# Patient Record
Sex: MALE | Race: WHITE
[De-identification: names, ages, dates, MRNs, and addresses within clinical notes are randomized per-mention and may not be internally consistent; named-entity substitution may affect disease eponyms.]

---

## 2024-08-06 PROBLEM — Z00.00 ENCOUNTER FOR PREVENTIVE HEALTH EXAMINATION: Status: ACTIVE | Noted: 2024-08-06

## 2024-08-07 ENCOUNTER — APPOINTMENT (OUTPATIENT)
Dept: ORTHOPEDIC SURGERY | Facility: CLINIC | Age: 64
End: 2024-08-07

## 2024-08-07 PROBLEM — I10 HYPERTENSION: Status: RESOLVED | Noted: 2024-08-07 | Resolved: 2024-08-07

## 2024-08-07 PROBLEM — S46.012A TRAUMATIC TEAR OF LEFT ROTATOR CUFF, UNSPECIFIED TEAR EXTENT, INITIAL ENCOUNTER: Status: ACTIVE | Noted: 2024-08-07

## 2024-08-07 PROBLEM — M75.42 IMPINGEMENT SYNDROME OF LEFT SHOULDER: Status: ACTIVE | Noted: 2024-08-07

## 2024-08-07 PROBLEM — M75.22 BICEPS TENDINITIS OF LEFT UPPER EXTREMITY: Status: ACTIVE | Noted: 2024-08-07

## 2024-08-07 PROBLEM — M75.41 IMPINGEMENT SYNDROME OF RIGHT SHOULDER: Status: ACTIVE | Noted: 2024-08-07

## 2024-08-07 PROBLEM — S46.812A PARTIAL TEAR OF LEFT SUBSCAPULARIS TENDON, INITIAL ENCOUNTER: Status: ACTIVE | Noted: 2024-08-07

## 2024-08-07 PROCEDURE — 73010 X-RAY EXAM OF SHOULDER BLADE: CPT | Mod: LT

## 2024-08-07 PROCEDURE — 99244 OFF/OP CNSLTJ NEW/EST MOD 40: CPT

## 2024-08-07 PROCEDURE — 73030 X-RAY EXAM OF SHOULDER: CPT | Mod: LT

## 2024-08-07 NOTE — IMAGING
[Left] : left shoulder [FreeTextEntry1] : The Gh is OK.  There are AC cysts. [FreeTextEntry5] : There is a Type I-II acromion.

## 2024-08-07 NOTE — ASSESSMENT
[FreeTextEntry1] : We reviewed the findings and the history. Questions were answered and concerns addressed. The options were outlined. A MDP is planned. PT advised for both shoulders. An MRI on the left is requested. He is WFD. He has help.  Patient was seen by Dr. David Estrada. Patient was seen by Marilyn CHUNG under the supervision of Dr. David Estrada. Progress note was completed by Marilyn CHUNG. Entered by Gisela Du acting as scribe.

## 2024-08-07 NOTE — CONSULT LETTER
[Dear  ___] : Dear  [unfilled], [FreeTextEntry1] : Thank you for referring your patient for consultation.  Please see my note below.   If you have any questions, please do not hesitate to contact me.   Sincerely,   David Estrada M.D. Shoulder Surgery

## 2024-08-07 NOTE — HISTORY OF PRESENT ILLNESS
[9] : 9 [Dull/Aching] : dull/aching [Full time] : Work status: full time [] : yes [FreeTextEntry1] : shoulders

## 2024-08-07 NOTE — REASON FOR VISIT
[FreeTextEntry2] :  7/12/24.  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   He injured the right shoulder in October, and has a separate claim for this.  Reaching is painful.  He notices pain when he wakes up in the morning.  The left arm feels weak.  There are days he can't lift it.  No numbness.  Advil 400mg helps.  No prior treatment to either shoulder.  No history of any issues.

## 2024-08-07 NOTE — PHYSICAL EXAM
[Left] : left shoulder [Moderate] : moderate [4 ___] : forward flexion 4[unfilled]/5 [Right] : right shoulder [Standing] : standing [Mild] : mild [5 ___] : forward flexion 5[unfilled]/5 [5___] : external rotation 5[unfilled]/5 [] : no sensory deficits [FreeTextEntry8] : There is LT tenderness. [de-identified] : +bellypress [FreeTextEntry9] : IR to T12. [TWNoteComboBox6] : internal rotation L1 [TWNoteComboBox4] : passive forward flexion 165 degrees [de-identified] : external rotation 75 degrees

## 2024-08-12 ENCOUNTER — APPOINTMENT (OUTPATIENT)
Dept: MRI IMAGING | Facility: CLINIC | Age: 64
End: 2024-08-12
Payer: OTHER MISCELLANEOUS

## 2024-08-12 PROCEDURE — 73221 MRI JOINT UPR EXTREM W/O DYE: CPT | Mod: LT

## 2024-08-28 ENCOUNTER — APPOINTMENT (OUTPATIENT)
Dept: ORTHOPEDIC SURGERY | Facility: CLINIC | Age: 64
End: 2024-08-28
Payer: OTHER MISCELLANEOUS

## 2024-08-28 VITALS — HEIGHT: 73 IN | BODY MASS INDEX: 22.53 KG/M2 | WEIGHT: 170 LBS

## 2024-08-28 DIAGNOSIS — S46.812A STRAIN OF OTHER MUSCLES, FASCIA AND TENDONS AT SHOULDER AND UPPER ARM LEVEL, LEFT ARM, INITIAL ENCOUNTER: ICD-10-CM

## 2024-08-28 DIAGNOSIS — M75.22 BICIPITAL TENDINITIS, LEFT SHOULDER: ICD-10-CM

## 2024-08-28 DIAGNOSIS — M75.42 IMPINGEMENT SYNDROME OF LEFT SHOULDER: ICD-10-CM

## 2024-08-28 PROCEDURE — 99214 OFFICE O/P EST MOD 30 MIN: CPT

## 2024-08-28 NOTE — HISTORY OF PRESENT ILLNESS
[9] : 9 [3] : 3 [Sharp] : sharp [Constant] : constant [Household chores] : household chores [Leisure] : leisure [Work] : work [Sleep] : sleep [Meds] : meds [Full time] : Work status: full time [de-identified] : WC DOI 7/12/24: Patient is here for MRI results of his left shoulder. [] : no [FreeTextEntry1] : Left shoulder [FreeTextEntry9] : Advil [de-identified] : certain movements

## 2024-08-28 NOTE — HISTORY OF PRESENT ILLNESS
[9] : 9 [3] : 3 [Sharp] : sharp [Constant] : constant [Household chores] : household chores [Leisure] : leisure [Work] : work [Sleep] : sleep [Meds] : meds [Full time] : Work status: full time [de-identified] : WC DOI 7/12/24: Patient is here for MRI results of his left shoulder. [] : no [FreeTextEntry1] : Left shoulder [FreeTextEntry9] : Advil [de-identified] : certain movements

## 2024-08-28 NOTE — CONSULT LETTER
[Dear  ___] : Dear  [unfilled], [Courtesy Letter:] : I had the pleasure of seeing your patient, [unfilled], in my office today. [FreeTextEntry1] : Please see my note below.  If you have any questions, please do not hesitate to contact me.  Sincerely,  David Estrada M.D. Shoulder Surgery

## 2024-08-28 NOTE — PHYSICAL EXAM
[Left] : left shoulder [Moderate] : moderate [Right] : right shoulder [Standing] : standing [Mild] : mild [5___] : external rotation 5[unfilled]/5 [5 ___] : forward flexion 5[unfilled]/5 [] : no sensory deficits [FreeTextEntry8] : There is LT tenderness. [de-identified] : +bellypress [FreeTextEntry9] : IR to T12. [TWNoteComboBox6] : internal rotation L1 [TWNoteComboBox4] : passive forward flexion 165 degrees [de-identified] : external rotation 75 degrees

## 2024-08-28 NOTE — ASSESSMENT
[FreeTextEntry1] : . We reviewed the MRI.  We discussed treatment options, both non-operative and operative.  I do think he is a candidate for surgery.  Pain relief is a goal as well as improving function and motion.  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  Interscalene anesthesia, general anesthesia and postoperative pain management were discussed.  The importance of physical therapy postoperatively, the gradual recovery and the rehabilitation program with initial driving restrictions were noted.  The use of a Cryo-Cuff by [employer] and a sling for functional recovery was reviewed.  He understands there are no guarantees.  The benefits of decreased pain, increased function and restoring anatomy were outlined.  The risks were reviewed including, but not limited to, infection, failure, bleeding, stiffness, pain, clotting, fracture, re-tear, hardware failure, deformity, functional limitation, scarring, neurovascular compromise, and narcotic use issues.  Under certain circumstances we discussed, further surgery may be indicated.  He understands that 100% recovery is not expected or guaranteed, and the desired level of function may not be achievable.  The complicated nature of his condition, including the tear pattern, was noted.  We discussed the potential for a prolonged recovery course and the potential for this to affect his activities, which could include a work regimen.  His questions were answered.  Other opinions can be pursued, as we discussed.  He does wish to proceed with surgery.  This would include a left shoulder arthroscopy, debridement, synovectomy, subscapularis repair, biceps tenodesis, decompression, distal clavicle resection,  Medical clearance is planned.  PT is prescribed.  We will schedule this at the earliest mutual convenient time.  Patient was seen by Dr. David Estrada. Patient was seen by Marilyn CHUNG under the supervision of Dr. David Estrada. Progress note was completed by Marilyn CHUNG. Entered by Gisela Du acting as scribe.

## 2024-08-28 NOTE — PHYSICAL EXAM
[Left] : left shoulder [Moderate] : moderate [Right] : right shoulder [Standing] : standing [Mild] : mild [5___] : external rotation 5[unfilled]/5 [5 ___] : forward flexion 5[unfilled]/5 [] : no sensory deficits [FreeTextEntry8] : There is LT tenderness. [de-identified] : +bellypress [FreeTextEntry9] : IR to T12. [TWNoteComboBox6] : internal rotation L1 [TWNoteComboBox4] : passive forward flexion 165 degrees [de-identified] : external rotation 75 degrees

## 2024-08-28 NOTE — DATA REVIEWED
[FreeTextEntry1] : MRI L SHOULDER [facility] 8/12/24:  The biceps is dislocated into a subscapularis tear. Labral tears are noted. Minor AC changes are noted. There is a small posterior 14mm cyst. The muscle is good.   L XR: The GH joint is OK. There are AC cysts. There is a Type I-II acromion.

## 2024-08-28 NOTE — REASON FOR VISIT
[FreeTextEntry2] : WC 7/12/24.  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  He injured the right shoulder in October, and has a separate claim for this.  Reaching is painful.  He notices pain when he wakes up in the morning.  The left arm feels weak.  There are days he can't lift it.  No numbness.  Advil 400mg helps.  The MRI was done 8/12/24.  MDP helped initially.  Advil helps.

## 2024-10-21 DIAGNOSIS — M75.22 BICIPITAL TENDINITIS, LEFT SHOULDER: ICD-10-CM

## 2024-10-21 RX ORDER — GABAPENTIN 300 MG/1
300 CAPSULE ORAL 3 TIMES DAILY
Qty: 15 | Refills: 0 | Status: ACTIVE | COMMUNITY
Start: 2024-10-21 | End: 1900-01-01

## 2024-10-21 RX ORDER — HYDROCODONE BITARTRATE AND ACETAMINOPHEN 7.5; 325 MG/1; MG/1
7.5-325 TABLET ORAL
Qty: 42 | Refills: 0 | Status: ACTIVE | COMMUNITY
Start: 2024-10-21 | End: 1900-01-01

## 2024-10-21 RX ORDER — KETOROLAC TROMETHAMINE 10 MG/1
10 TABLET, FILM COATED ORAL EVERY 6 HOURS
Qty: 20 | Refills: 0 | Status: ACTIVE | COMMUNITY
Start: 2024-10-21 | End: 1900-01-01

## 2024-10-23 ENCOUNTER — APPOINTMENT (OUTPATIENT)
Dept: ORTHOPEDIC SURGERY | Facility: CLINIC | Age: 64
End: 2024-10-23

## 2024-10-23 PROCEDURE — L3670: CPT | Mod: 1L,LT

## 2024-10-31 ENCOUNTER — APPOINTMENT (OUTPATIENT)
Age: 64
End: 2024-10-31

## 2024-10-31 PROCEDURE — 29827 SHO ARTHRS SRG RT8TR CUF RPR: CPT | Mod: AS,LT

## 2024-10-31 PROCEDURE — 29823 SHO ARTHRS SRG XTNSV DBRDMT: CPT | Mod: AS,59,LT

## 2024-10-31 PROCEDURE — 29827 SHO ARTHRS SRG RT8TR CUF RPR: CPT | Mod: LT

## 2024-10-31 PROCEDURE — 29828 SHO ARTHRS SRG BICP TENODSIS: CPT | Mod: LT

## 2024-10-31 PROCEDURE — 29828 SHO ARTHRS SRG BICP TENODSIS: CPT | Mod: AS,LT

## 2024-10-31 PROCEDURE — 29824 SHO ARTHRS SRG DSTL CLAVICLC: CPT | Mod: AS,LT

## 2024-10-31 PROCEDURE — 29824 SHO ARTHRS SRG DSTL CLAVICLC: CPT | Mod: LT

## 2024-10-31 PROCEDURE — 29823 SHO ARTHRS SRG XTNSV DBRDMT: CPT | Mod: 59,LT

## 2024-10-31 PROCEDURE — 29821 SHO ARTHRS SRG COMPL SYNVCT: CPT | Mod: AS,LT

## 2024-10-31 PROCEDURE — 29826 SHO ARTHRS SRG DECOMPRESSION: CPT | Mod: AS,LT

## 2024-10-31 PROCEDURE — 29826 SHO ARTHRS SRG DECOMPRESSION: CPT | Mod: LT

## 2024-10-31 PROCEDURE — 29821 SHO ARTHRS SRG COMPL SYNVCT: CPT | Mod: LT

## 2024-11-06 ENCOUNTER — APPOINTMENT (OUTPATIENT)
Dept: ORTHOPEDIC SURGERY | Facility: CLINIC | Age: 64
End: 2024-11-06
Payer: OTHER MISCELLANEOUS

## 2024-11-06 VITALS — WEIGHT: 170 LBS | BODY MASS INDEX: 22.53 KG/M2 | HEIGHT: 73 IN

## 2024-11-06 DIAGNOSIS — M75.42 IMPINGEMENT SYNDROME OF LEFT SHOULDER: ICD-10-CM

## 2024-11-06 DIAGNOSIS — M25.512 PAIN IN LEFT SHOULDER: ICD-10-CM

## 2024-11-06 DIAGNOSIS — S46.812D STRAIN OF OTHER MUSCLES, FASCIA AND TENDONS AT SHOULDER AND UPPER ARM LEVEL, LEFT ARM, SUBSEQUENT ENCOUNTER: ICD-10-CM

## 2024-11-06 DIAGNOSIS — M75.22 BICIPITAL TENDINITIS, LEFT SHOULDER: ICD-10-CM

## 2024-11-06 PROCEDURE — 73030 X-RAY EXAM OF SHOULDER: CPT | Mod: LT

## 2024-11-06 PROCEDURE — 99024 POSTOP FOLLOW-UP VISIT: CPT

## 2024-12-04 ENCOUNTER — APPOINTMENT (OUTPATIENT)
Dept: ORTHOPEDIC SURGERY | Facility: CLINIC | Age: 64
End: 2024-12-04
Payer: OTHER MISCELLANEOUS

## 2024-12-04 VITALS — BODY MASS INDEX: 22.53 KG/M2 | HEIGHT: 73 IN | WEIGHT: 170 LBS

## 2024-12-04 DIAGNOSIS — S46.812D STRAIN OF OTHER MUSCLES, FASCIA AND TENDONS AT SHOULDER AND UPPER ARM LEVEL, LEFT ARM, SUBSEQUENT ENCOUNTER: ICD-10-CM

## 2024-12-04 DIAGNOSIS — M75.42 IMPINGEMENT SYNDROME OF LEFT SHOULDER: ICD-10-CM

## 2024-12-04 DIAGNOSIS — M75.22 BICIPITAL TENDINITIS, LEFT SHOULDER: ICD-10-CM

## 2024-12-04 DIAGNOSIS — M25.512 PAIN IN LEFT SHOULDER: ICD-10-CM

## 2024-12-04 PROCEDURE — 99024 POSTOP FOLLOW-UP VISIT: CPT

## 2025-01-08 ENCOUNTER — APPOINTMENT (OUTPATIENT)
Dept: ORTHOPEDIC SURGERY | Facility: CLINIC | Age: 65
End: 2025-01-08
Payer: OTHER MISCELLANEOUS

## 2025-01-08 VITALS — BODY MASS INDEX: 22.53 KG/M2 | HEIGHT: 73 IN | WEIGHT: 170 LBS

## 2025-01-08 DIAGNOSIS — M25.512 PAIN IN LEFT SHOULDER: ICD-10-CM

## 2025-01-08 DIAGNOSIS — M75.42 IMPINGEMENT SYNDROME OF LEFT SHOULDER: ICD-10-CM

## 2025-01-08 DIAGNOSIS — M75.22 BICIPITAL TENDINITIS, LEFT SHOULDER: ICD-10-CM

## 2025-01-08 DIAGNOSIS — S46.812D STRAIN OF OTHER MUSCLES, FASCIA AND TENDONS AT SHOULDER AND UPPER ARM LEVEL, LEFT ARM, SUBSEQUENT ENCOUNTER: ICD-10-CM

## 2025-01-08 PROCEDURE — 99024 POSTOP FOLLOW-UP VISIT: CPT

## 2025-02-05 ENCOUNTER — APPOINTMENT (OUTPATIENT)
Dept: ORTHOPEDIC SURGERY | Facility: CLINIC | Age: 65
End: 2025-02-05
Payer: OTHER MISCELLANEOUS

## 2025-02-05 VITALS — WEIGHT: 170 LBS | HEIGHT: 73 IN | BODY MASS INDEX: 22.53 KG/M2

## 2025-02-05 DIAGNOSIS — M25.512 PAIN IN LEFT SHOULDER: ICD-10-CM

## 2025-02-05 DIAGNOSIS — M75.42 IMPINGEMENT SYNDROME OF LEFT SHOULDER: ICD-10-CM

## 2025-02-05 DIAGNOSIS — S46.812D STRAIN OF OTHER MUSCLES, FASCIA AND TENDONS AT SHOULDER AND UPPER ARM LEVEL, LEFT ARM, SUBSEQUENT ENCOUNTER: ICD-10-CM

## 2025-02-05 DIAGNOSIS — S46.012A STRAIN OF MUSCLE(S) AND TENDON(S) OF THE ROTATOR CUFF OF LEFT SHOULDER, INITIAL ENCOUNTER: ICD-10-CM

## 2025-02-05 DIAGNOSIS — M75.22 BICIPITAL TENDINITIS, LEFT SHOULDER: ICD-10-CM

## 2025-02-05 PROCEDURE — 99213 OFFICE O/P EST LOW 20 MIN: CPT

## 2025-03-05 ENCOUNTER — APPOINTMENT (OUTPATIENT)
Dept: ORTHOPEDIC SURGERY | Facility: CLINIC | Age: 65
End: 2025-03-05

## 2025-03-05 VITALS — HEIGHT: 73 IN | BODY MASS INDEX: 22.53 KG/M2 | WEIGHT: 170 LBS

## 2025-03-05 DIAGNOSIS — M75.42 IMPINGEMENT SYNDROME OF LEFT SHOULDER: ICD-10-CM

## 2025-03-05 DIAGNOSIS — M75.22 BICIPITAL TENDINITIS, LEFT SHOULDER: ICD-10-CM

## 2025-03-05 DIAGNOSIS — M25.512 PAIN IN LEFT SHOULDER: ICD-10-CM

## 2025-03-05 DIAGNOSIS — S46.812D STRAIN OF OTHER MUSCLES, FASCIA AND TENDONS AT SHOULDER AND UPPER ARM LEVEL, LEFT ARM, SUBSEQUENT ENCOUNTER: ICD-10-CM

## 2025-03-05 DIAGNOSIS — S46.012A STRAIN OF MUSCLE(S) AND TENDON(S) OF THE ROTATOR CUFF OF LEFT SHOULDER, INITIAL ENCOUNTER: ICD-10-CM

## 2025-03-05 PROCEDURE — 99213 OFFICE O/P EST LOW 20 MIN: CPT

## 2025-03-05 RX ORDER — DICLOFENAC POTASSIUM 50 MG/1
50 TABLET, COATED ORAL TWICE DAILY
Qty: 60 | Refills: 0 | Status: ACTIVE | COMMUNITY
Start: 2025-03-05 | End: 1900-01-01

## 2025-04-02 ENCOUNTER — APPOINTMENT (OUTPATIENT)
Dept: ORTHOPEDIC SURGERY | Facility: CLINIC | Age: 65
End: 2025-04-02
Payer: OTHER MISCELLANEOUS

## 2025-04-02 VITALS — WEIGHT: 170 LBS | BODY MASS INDEX: 22.53 KG/M2 | HEIGHT: 73 IN

## 2025-04-02 DIAGNOSIS — M19.012 PRIMARY OSTEOARTHRITIS, LEFT SHOULDER: ICD-10-CM

## 2025-04-02 DIAGNOSIS — M75.42 IMPINGEMENT SYNDROME OF LEFT SHOULDER: ICD-10-CM

## 2025-04-02 DIAGNOSIS — M75.22 BICIPITAL TENDINITIS, LEFT SHOULDER: ICD-10-CM

## 2025-04-02 DIAGNOSIS — S46.812D STRAIN OF OTHER MUSCLES, FASCIA AND TENDONS AT SHOULDER AND UPPER ARM LEVEL, LEFT ARM, SUBSEQUENT ENCOUNTER: ICD-10-CM

## 2025-04-02 PROCEDURE — 99214 OFFICE O/P EST MOD 30 MIN: CPT

## 2025-04-02 RX ORDER — NAPROXEN 500 MG/1
500 TABLET ORAL
Qty: 60 | Refills: 0 | Status: ACTIVE | COMMUNITY
Start: 2025-04-02 | End: 1900-01-01

## 2025-04-30 ENCOUNTER — APPOINTMENT (OUTPATIENT)
Dept: ORTHOPEDIC SURGERY | Facility: CLINIC | Age: 65
End: 2025-04-30
Payer: OTHER MISCELLANEOUS

## 2025-04-30 VITALS — BODY MASS INDEX: 22.53 KG/M2 | WEIGHT: 170 LBS | HEIGHT: 73 IN

## 2025-04-30 DIAGNOSIS — M75.42 IMPINGEMENT SYNDROME OF LEFT SHOULDER: ICD-10-CM

## 2025-04-30 DIAGNOSIS — M19.012 PRIMARY OSTEOARTHRITIS, LEFT SHOULDER: ICD-10-CM

## 2025-04-30 DIAGNOSIS — M75.22 BICIPITAL TENDINITIS, LEFT SHOULDER: ICD-10-CM

## 2025-04-30 DIAGNOSIS — S46.812D STRAIN OF OTHER MUSCLES, FASCIA AND TENDONS AT SHOULDER AND UPPER ARM LEVEL, LEFT ARM, SUBSEQUENT ENCOUNTER: ICD-10-CM

## 2025-04-30 PROCEDURE — 99214 OFFICE O/P EST MOD 30 MIN: CPT

## 2025-04-30 RX ORDER — IBUPROFEN 800 MG/1
800 TABLET, FILM COATED ORAL 3 TIMES DAILY
Qty: 90 | Refills: 0 | Status: ACTIVE | COMMUNITY
Start: 2025-04-30 | End: 1900-01-01

## 2025-06-04 ENCOUNTER — APPOINTMENT (OUTPATIENT)
Dept: ORTHOPEDIC SURGERY | Facility: CLINIC | Age: 65
End: 2025-06-04
Payer: OTHER MISCELLANEOUS

## 2025-06-04 VITALS — BODY MASS INDEX: 22.53 KG/M2 | HEIGHT: 73 IN | WEIGHT: 170 LBS

## 2025-06-04 DIAGNOSIS — M75.22 BICIPITAL TENDINITIS, LEFT SHOULDER: ICD-10-CM

## 2025-06-04 DIAGNOSIS — M19.012 PRIMARY OSTEOARTHRITIS, LEFT SHOULDER: ICD-10-CM

## 2025-06-04 DIAGNOSIS — S46.812D STRAIN OF OTHER MUSCLES, FASCIA AND TENDONS AT SHOULDER AND UPPER ARM LEVEL, LEFT ARM, SUBSEQUENT ENCOUNTER: ICD-10-CM

## 2025-06-04 DIAGNOSIS — M75.42 IMPINGEMENT SYNDROME OF LEFT SHOULDER: ICD-10-CM

## 2025-06-04 PROCEDURE — 99214 OFFICE O/P EST MOD 30 MIN: CPT

## 2025-07-16 ENCOUNTER — APPOINTMENT (OUTPATIENT)
Dept: ORTHOPEDIC SURGERY | Facility: CLINIC | Age: 65
End: 2025-07-16
Payer: OTHER MISCELLANEOUS

## 2025-07-16 VITALS — HEIGHT: 73 IN | BODY MASS INDEX: 22.53 KG/M2 | WEIGHT: 170 LBS

## 2025-07-16 PROCEDURE — 99214 OFFICE O/P EST MOD 30 MIN: CPT

## 2025-08-27 ENCOUNTER — APPOINTMENT (OUTPATIENT)
Dept: ORTHOPEDIC SURGERY | Facility: CLINIC | Age: 65
End: 2025-08-27
Payer: OTHER MISCELLANEOUS

## 2025-08-27 DIAGNOSIS — M75.22 BICIPITAL TENDINITIS, LEFT SHOULDER: ICD-10-CM

## 2025-08-27 DIAGNOSIS — M75.42 IMPINGEMENT SYNDROME OF LEFT SHOULDER: ICD-10-CM

## 2025-08-27 DIAGNOSIS — M19.012 PRIMARY OSTEOARTHRITIS, LEFT SHOULDER: ICD-10-CM

## 2025-08-27 DIAGNOSIS — S46.812D STRAIN OF OTHER MUSCLES, FASCIA AND TENDONS AT SHOULDER AND UPPER ARM LEVEL, LEFT ARM, SUBSEQUENT ENCOUNTER: ICD-10-CM

## 2025-08-27 PROCEDURE — 99213 OFFICE O/P EST LOW 20 MIN: CPT

## 2025-09-10 ENCOUNTER — APPOINTMENT (OUTPATIENT)
Dept: ORTHOPEDIC SURGERY | Facility: CLINIC | Age: 65
End: 2025-09-10
Payer: OTHER MISCELLANEOUS

## 2025-09-10 DIAGNOSIS — M75.41 IMPINGEMENT SYNDROME OF RIGHT SHOULDER: ICD-10-CM

## 2025-09-10 DIAGNOSIS — S46.812D STRAIN OF OTHER MUSCLES, FASCIA AND TENDONS AT SHOULDER AND UPPER ARM LEVEL, LEFT ARM, SUBSEQUENT ENCOUNTER: ICD-10-CM

## 2025-09-10 DIAGNOSIS — M75.22 BICIPITAL TENDINITIS, LEFT SHOULDER: ICD-10-CM

## 2025-09-10 DIAGNOSIS — M19.012 PRIMARY OSTEOARTHRITIS, LEFT SHOULDER: ICD-10-CM

## 2025-09-10 DIAGNOSIS — M75.42 IMPINGEMENT SYNDROME OF LEFT SHOULDER: ICD-10-CM

## 2025-09-10 PROCEDURE — 99243 OFF/OP CNSLTJ NEW/EST LOW 30: CPT
